# Patient Record
Sex: FEMALE | URBAN - METROPOLITAN AREA
[De-identification: names, ages, dates, MRNs, and addresses within clinical notes are randomized per-mention and may not be internally consistent; named-entity substitution may affect disease eponyms.]

---

## 2022-12-15 ENCOUNTER — OFFICE VISIT (OUTPATIENT)
Dept: URBAN - METROPOLITAN AREA CLINIC 2 | Facility: CLINIC | Age: 32
End: 2022-12-15

## 2024-05-29 ENCOUNTER — DASHBOARD ENCOUNTERS (OUTPATIENT)
Age: 34
End: 2024-05-29

## 2024-05-30 ENCOUNTER — OFFICE VISIT (OUTPATIENT)
Dept: URBAN - METROPOLITAN AREA CLINIC 19 | Facility: CLINIC | Age: 34
End: 2024-05-30
Payer: COMMERCIAL

## 2024-05-30 ENCOUNTER — LAB OUTSIDE AN ENCOUNTER (OUTPATIENT)
Dept: URBAN - METROPOLITAN AREA CLINIC 19 | Facility: CLINIC | Age: 34
End: 2024-05-30

## 2024-05-30 VITALS
WEIGHT: 180 LBS | HEIGHT: 67 IN | BODY MASS INDEX: 28.25 KG/M2 | HEART RATE: 87 BPM | DIASTOLIC BLOOD PRESSURE: 80 MMHG | SYSTOLIC BLOOD PRESSURE: 130 MMHG | TEMPERATURE: 97.7 F

## 2024-05-30 DIAGNOSIS — Z79.899 HIGH RISK MEDICATION USE: ICD-10-CM

## 2024-05-30 DIAGNOSIS — K50.811 CROHN'S DISEASE OF BOTH SMALL AND LARGE INTESTINE WITH RECTAL BLEEDING: ICD-10-CM

## 2024-05-30 PROBLEM — 71833008: Status: ACTIVE | Noted: 2024-05-30

## 2024-05-30 PROCEDURE — 99205 OFFICE O/P NEW HI 60 MIN: CPT | Performed by: INTERNAL MEDICINE

## 2024-05-30 RX ORDER — VEDOLIZUMAB 300 MG/5ML
AS DIRECTED INJECTION, POWDER, LYOPHILIZED, FOR SOLUTION INTRAVENOUS
Qty: 1 | Refills: 11 | OUTPATIENT
Start: 2024-05-30 | End: 2025-05-25

## 2024-06-01 ENCOUNTER — LAB OUTSIDE AN ENCOUNTER (OUTPATIENT)
Dept: URBAN - METROPOLITAN AREA CLINIC 19 | Facility: CLINIC | Age: 34
End: 2024-06-01

## 2024-06-03 ENCOUNTER — TELEPHONE ENCOUNTER (OUTPATIENT)
Dept: URBAN - METROPOLITAN AREA CLINIC 19 | Facility: CLINIC | Age: 34
End: 2024-06-03

## 2024-06-05 ENCOUNTER — WEB ENCOUNTER (OUTPATIENT)
Dept: URBAN - METROPOLITAN AREA CLINIC 19 | Facility: CLINIC | Age: 34
End: 2024-06-05

## 2024-06-05 RX ORDER — PREDNISONE 5 MG/1
4 TABLETS ONCE A DAY FOR 7 DAYS, 3 TABLETS ONCE A DAY FOR 7 DAYS, 2 TABLETS ONCE A DAY FOR 7 DAYS, 1 TABLET ONCE A DAY FOR 7 DAYS TABLET ORAL
Qty: 70 TABLET

## 2024-06-06 ENCOUNTER — TELEPHONE ENCOUNTER (OUTPATIENT)
Dept: URBAN - METROPOLITAN AREA CLINIC 19 | Facility: CLINIC | Age: 34
End: 2024-06-06

## 2024-06-06 LAB
ADENOVIRUS F 40/41: NOT DETECTED
CAMPYLOBACTER: NOT DETECTED
CLOSTRIDIUM DIFFICILE: NOT DETECTED
ENTAMOEBA HISTOLYTICA: NOT DETECTED
ENTEROAGGREGATIVE E.COLI: NOT DETECTED
ENTEROTOXIGENIC E.COLI: NOT DETECTED
GIARDIA LAMBLIA: NOT DETECTED
NOROVIRUS GI/GII: NOT DETECTED
ROTAVIRUS A: NOT DETECTED
SHIGA-LIKE TOXIN PRODUCING E.COLI: DETECTED
SHIGA-LIKE TOXIN PRODUCING E.COLI: NOT DETECTED
VIBRIO CHOLERAE: NOT DETECTED
VIBRIO SPP.: NOT DETECTED
YERSINIA ENTEROCOLITICA: NOT DETECTED
YERSINIA ENTEROCOLITICA: NOT DETECTED

## 2024-06-06 RX ORDER — AZITHROMYCIN MONOHYDRATE 500 MG/1
1 TABLET TABLET, FILM COATED ORAL DAILY
Qty: 3 TABLET | Refills: 0 | OUTPATIENT
Start: 2024-06-06 | End: 2024-06-09

## 2024-06-12 ENCOUNTER — OFFICE VISIT (OUTPATIENT)
Dept: URBAN - METROPOLITAN AREA CLINIC 18 | Facility: CLINIC | Age: 34
End: 2024-06-12
Payer: COMMERCIAL

## 2024-06-12 DIAGNOSIS — Z23 ENCOUNTER FOR VACCINATION: ICD-10-CM

## 2024-06-12 PROCEDURE — 90471 IMMUNIZATION ADMIN: CPT | Performed by: INTERNAL MEDICINE

## 2024-06-12 PROCEDURE — 90746 HEPB VACCINE 3 DOSE ADULT IM: CPT | Performed by: INTERNAL MEDICINE

## 2024-06-12 RX ORDER — VEDOLIZUMAB 300 MG/5ML
AS DIRECTED INJECTION, POWDER, LYOPHILIZED, FOR SOLUTION INTRAVENOUS
Qty: 1 | Refills: 11 | Status: ACTIVE | COMMUNITY
Start: 2024-05-30 | End: 2025-05-25

## 2024-06-12 RX ORDER — PREDNISONE 5 MG/1
4 TABLETS ONCE A DAY FOR 7 DAYS, 3 TABLETS ONCE A DAY FOR 7 DAYS, 2 TABLETS ONCE A DAY FOR 7 DAYS, 1 TABLET ONCE A DAY FOR 7 DAYS TABLET ORAL
Qty: 70 TABLET | Status: ACTIVE | COMMUNITY

## 2024-06-17 ENCOUNTER — TELEPHONE ENCOUNTER (OUTPATIENT)
Dept: URBAN - METROPOLITAN AREA CLINIC 19 | Facility: CLINIC | Age: 34
End: 2024-06-17

## 2024-07-10 ENCOUNTER — OFFICE VISIT (OUTPATIENT)
Dept: URBAN - METROPOLITAN AREA CLINIC 18 | Facility: CLINIC | Age: 34
End: 2024-07-10
Payer: COMMERCIAL

## 2024-07-10 DIAGNOSIS — Z23 ENCOUNTER FOR IMMUNIZATION: ICD-10-CM

## 2024-07-10 PROCEDURE — 90746 HEPB VACCINE 3 DOSE ADULT IM: CPT | Performed by: INTERNAL MEDICINE

## 2024-07-10 PROCEDURE — 90471 IMMUNIZATION ADMIN: CPT | Performed by: INTERNAL MEDICINE

## 2024-07-10 RX ORDER — PREDNISONE 5 MG/1
4 TABLETS ONCE A DAY FOR 7 DAYS, 3 TABLETS ONCE A DAY FOR 7 DAYS, 2 TABLETS ONCE A DAY FOR 7 DAYS, 1 TABLET ONCE A DAY FOR 7 DAYS TABLET ORAL
Qty: 70 TABLET | Status: ACTIVE | COMMUNITY

## 2024-07-10 RX ORDER — VEDOLIZUMAB 300 MG/5ML
AS DIRECTED INJECTION, POWDER, LYOPHILIZED, FOR SOLUTION INTRAVENOUS
Qty: 1 | Refills: 11 | Status: ACTIVE | COMMUNITY
Start: 2024-05-30 | End: 2025-05-25

## 2024-11-27 ENCOUNTER — WEB ENCOUNTER (OUTPATIENT)
Dept: URBAN - METROPOLITAN AREA CLINIC 19 | Facility: CLINIC | Age: 34
End: 2024-11-27

## 2024-12-03 ENCOUNTER — TELEPHONE ENCOUNTER (OUTPATIENT)
Dept: URBAN - METROPOLITAN AREA CLINIC 19 | Facility: CLINIC | Age: 34
End: 2024-12-03

## 2024-12-03 ENCOUNTER — CLAIMS CREATED FROM THE CLAIM WINDOW (OUTPATIENT)
Dept: URBAN - METROPOLITAN AREA SURGERY CENTER 31 | Facility: SURGERY CENTER | Age: 34
End: 2024-12-03
Payer: COMMERCIAL

## 2024-12-03 DIAGNOSIS — K50.80 CROHN'S COLITIS: ICD-10-CM

## 2024-12-03 DIAGNOSIS — K51.90 ULCERATIVE COLITIS, UNSPECIFIED: ICD-10-CM

## 2024-12-03 DIAGNOSIS — K63.89 OTHER SPECIFIED DISEASES OF INTESTINE: ICD-10-CM

## 2024-12-03 PROCEDURE — 45380 COLONOSCOPY AND BIOPSY: CPT | Performed by: INTERNAL MEDICINE

## 2024-12-03 PROCEDURE — 00811 ANES LWR INTST NDSC NOS: CPT | Performed by: NURSE ANESTHETIST, CERTIFIED REGISTERED

## 2024-12-03 RX ORDER — VEDOLIZUMAB 300 MG/5ML
AS DIRECTED INJECTION, POWDER, LYOPHILIZED, FOR SOLUTION INTRAVENOUS
Qty: 1 | Refills: 11 | Status: ACTIVE | COMMUNITY
Start: 2024-05-30 | End: 2025-05-25

## 2024-12-03 RX ORDER — PREDNISONE 5 MG/1
4 TABLETS ONCE A DAY FOR 7 DAYS, 3 TABLETS ONCE A DAY FOR 7 DAYS, 2 TABLETS ONCE A DAY FOR 7 DAYS, 1 TABLET ONCE A DAY FOR 7 DAYS TABLET ORAL
Qty: 70 TABLET | Status: ACTIVE | COMMUNITY

## 2024-12-05 ENCOUNTER — TELEPHONE ENCOUNTER (OUTPATIENT)
Dept: URBAN - METROPOLITAN AREA CLINIC 19 | Facility: CLINIC | Age: 34
End: 2024-12-05

## 2024-12-05 RX ORDER — PREDNISONE 5 MG/1
8 TABLETS ONCE A DAY FOR 7 DAYS, 7 TABLETS ONCE A DAY FOR 7 DAYS, 6 TABLETS ONCE A DAY FOR 7 DAYS, 5 TABLETS ONCE A DAY FOR 7 DAYS, 4 TABLETS ONCE A DAY FOR 7 DAYS, 3 TABLETS ONCE A DAY FOR 7 DAYS, 2 TABLETS ONCE A DAY FOR 7 DAYS, 1 TABLET ONCE A DAY FOR 7 DAYS TABLET ORAL ONCE A DAY
Qty: 252 TABLET | Refills: 0 | OUTPATIENT
Start: 2024-12-06 | End: 2025-01-05

## 2024-12-20 ENCOUNTER — OFFICE VISIT (OUTPATIENT)
Dept: URBAN - METROPOLITAN AREA CLINIC 128 | Facility: CLINIC | Age: 34
End: 2024-12-20
Payer: COMMERCIAL

## 2024-12-20 VITALS
DIASTOLIC BLOOD PRESSURE: 80 MMHG | TEMPERATURE: 98.7 F | WEIGHT: 178.8 LBS | HEIGHT: 67 IN | SYSTOLIC BLOOD PRESSURE: 130 MMHG | BODY MASS INDEX: 28.06 KG/M2

## 2024-12-20 DIAGNOSIS — K50.811 CROHN'S DISEASE OF BOTH SMALL AND LARGE INTESTINE WITH RECTAL BLEEDING: ICD-10-CM

## 2024-12-20 DIAGNOSIS — Z79.899 HIGH RISK MEDICATION USE: ICD-10-CM

## 2024-12-20 DIAGNOSIS — R11.0 NAUSEA: ICD-10-CM

## 2024-12-20 PROCEDURE — 99215 OFFICE O/P EST HI 40 MIN: CPT | Performed by: INTERNAL MEDICINE

## 2024-12-20 RX ORDER — ONDANSETRON 4 MG/1
1 TABLET ON THE TONGUE AND ALLOW TO DISSOLVE TABLET, ORALLY DISINTEGRATING ORAL
Qty: 60 TABLET | Refills: 1 | OUTPATIENT
Start: 2024-12-20

## 2024-12-20 RX ORDER — PREDNISONE 5 MG/1
4 TABLETS ONCE A DAY FOR 7 DAYS, 3 TABLETS ONCE A DAY FOR 7 DAYS, 2 TABLETS ONCE A DAY FOR 7 DAYS, 1 TABLET ONCE A DAY FOR 7 DAYS TABLET ORAL
Qty: 70 TABLET | Status: ACTIVE | COMMUNITY

## 2024-12-20 RX ORDER — RISANKIZUMAB-RZAA 360 MG/2.4
1.2 ML WEARABLE INJECTOR SUBCUTANEOUS
Refills: 11 | OUTPATIENT
Start: 2024-12-20

## 2024-12-20 RX ORDER — RISANKIZUMAB-RZAA 60 MG/ML
AS DIRECTED INJECTION INTRAVENOUS
OUTPATIENT
Start: 2024-12-20

## 2024-12-20 RX ORDER — PREDNISONE 5 MG/1
8 TABLETS ONCE A DAY FOR 7 DAYS, 7 TABLETS ONCE A DAY FOR 7 DAYS, 6 TABLETS ONCE A DAY FOR 7 DAYS, 5 TABLETS ONCE A DAY FOR 7 DAYS, 4 TABLETS ONCE A DAY FOR 7 DAYS, 3 TABLETS ONCE A DAY FOR 7 DAYS, 2 TABLETS ONCE A DAY FOR 7 DAYS, 1 TABLET ONCE A DAY FOR 7 DAYS TABLET ORAL ONCE A DAY
Qty: 252 TABLET | Refills: 0 | Status: ACTIVE | COMMUNITY
Start: 2024-12-06 | End: 2025-01-05

## 2024-12-20 RX ORDER — VEDOLIZUMAB 300 MG/5ML
AS DIRECTED INJECTION, POWDER, LYOPHILIZED, FOR SOLUTION INTRAVENOUS
Qty: 1 | Refills: 11 | Status: ACTIVE | COMMUNITY
Start: 2024-05-30 | End: 2025-05-25

## 2024-12-20 NOTE — HPI-TODAY'S VISIT:
Mrs. Boggs is a 34 year old female with Crohn's disease who was last seen in GI clinic on 5/30/2024.    She is currently on entyvio and reported feeling good when she came in for her colonoscopy.   On 12/3/2024 colonoscopy showed normal terminal ileum and diffuse erythema and ulcerated mucosa in the entire colon.   Pathology showed diffuse active colitis with mild architectural disarray in the rectum, ascending colon, and cecum. The sigmoid colon was unremarkable. The desceding colon and trasnverse colon shoed focal active colitis.   She and her boyfriend both have had URI illness which she reports having difficulty getting better from. She reports testing negative for COVID and having no fevers 1 week.   Prior history is summarized below: -She was diagnosed with Crohn's disease at age 23.  -She reports having a colonoscopy 1/2023 with Dr. Brand and reports being told she had inflammation in the colon; she reports having inflammation in her terminal ileum.  -She was on mesalamine which caused her to have migraines. She was on humira but it stopped working.

## 2024-12-26 ENCOUNTER — CLAIMS CREATED FROM THE CLAIM WINDOW (OUTPATIENT)
Dept: URBAN - METROPOLITAN AREA MEDICAL CENTER 18 | Facility: MEDICAL CENTER | Age: 34
End: 2024-12-26

## 2024-12-26 PROCEDURE — 99254 IP/OBS CNSLTJ NEW/EST MOD 60: CPT | Performed by: STUDENT IN AN ORGANIZED HEALTH CARE EDUCATION/TRAINING PROGRAM

## 2024-12-27 ENCOUNTER — CLAIMS CREATED FROM THE CLAIM WINDOW (OUTPATIENT)
Dept: URBAN - METROPOLITAN AREA MEDICAL CENTER 18 | Facility: MEDICAL CENTER | Age: 34
End: 2024-12-27

## 2024-12-27 PROCEDURE — 99232 SBSQ HOSP IP/OBS MODERATE 35: CPT | Performed by: STUDENT IN AN ORGANIZED HEALTH CARE EDUCATION/TRAINING PROGRAM

## 2024-12-28 ENCOUNTER — CLAIMS CREATED FROM THE CLAIM WINDOW (OUTPATIENT)
Dept: URBAN - METROPOLITAN AREA MEDICAL CENTER 18 | Facility: MEDICAL CENTER | Age: 34
End: 2024-12-28

## 2024-12-28 PROCEDURE — 99232 SBSQ HOSP IP/OBS MODERATE 35: CPT | Performed by: STUDENT IN AN ORGANIZED HEALTH CARE EDUCATION/TRAINING PROGRAM

## 2025-01-05 ENCOUNTER — TELEPHONE ENCOUNTER (OUTPATIENT)
Dept: URBAN - METROPOLITAN AREA CLINIC 19 | Facility: CLINIC | Age: 35
End: 2025-01-05

## 2025-02-12 ENCOUNTER — OFFICE VISIT (OUTPATIENT)
Dept: URBAN - METROPOLITAN AREA CLINIC 18 | Facility: CLINIC | Age: 35
End: 2025-02-12
Payer: COMMERCIAL

## 2025-02-12 DIAGNOSIS — Z23 ENCOUNTER FOR IMMUNIZATION: ICD-10-CM

## 2025-02-12 PROCEDURE — 90746 HEPB VACCINE 3 DOSE ADULT IM: CPT | Performed by: INTERNAL MEDICINE

## 2025-02-12 PROCEDURE — 90471 IMMUNIZATION ADMIN: CPT | Performed by: INTERNAL MEDICINE

## 2025-02-12 RX ORDER — PREDNISONE 5 MG/1
4 TABLETS ONCE A DAY FOR 7 DAYS, 3 TABLETS ONCE A DAY FOR 7 DAYS, 2 TABLETS ONCE A DAY FOR 7 DAYS, 1 TABLET ONCE A DAY FOR 7 DAYS TABLET ORAL
Qty: 70 TABLET | Status: ACTIVE | COMMUNITY

## 2025-02-12 RX ORDER — ONDANSETRON 4 MG/1
1 TABLET ON THE TONGUE AND ALLOW TO DISSOLVE TABLET, ORALLY DISINTEGRATING ORAL
Qty: 60 TABLET | Refills: 1 | Status: ACTIVE | COMMUNITY
Start: 2024-12-20

## 2025-02-12 RX ORDER — VEDOLIZUMAB 300 MG/5ML
AS DIRECTED INJECTION, POWDER, LYOPHILIZED, FOR SOLUTION INTRAVENOUS
Qty: 1 | Refills: 11 | Status: ACTIVE | COMMUNITY
Start: 2024-05-30 | End: 2025-05-25

## 2025-02-12 RX ORDER — RISANKIZUMAB-RZAA 60 MG/ML
AS DIRECTED INJECTION INTRAVENOUS
Status: ACTIVE | COMMUNITY
Start: 2024-12-20

## 2025-02-12 RX ORDER — RISANKIZUMAB-RZAA 360 MG/2.4
1.2 ML WEARABLE INJECTOR SUBCUTANEOUS
Refills: 11 | Status: ACTIVE | COMMUNITY
Start: 2024-12-20

## 2025-02-14 ENCOUNTER — TELEPHONE ENCOUNTER (OUTPATIENT)
Dept: URBAN - METROPOLITAN AREA CLINIC 19 | Facility: CLINIC | Age: 35
End: 2025-02-14

## 2025-02-14 ENCOUNTER — OFFICE VISIT (OUTPATIENT)
Dept: URBAN - METROPOLITAN AREA CLINIC 19 | Facility: CLINIC | Age: 35
End: 2025-02-14
Payer: COMMERCIAL

## 2025-02-14 VITALS
DIASTOLIC BLOOD PRESSURE: 80 MMHG | HEIGHT: 67 IN | TEMPERATURE: 98.6 F | WEIGHT: 181.4 LBS | HEART RATE: 61 BPM | SYSTOLIC BLOOD PRESSURE: 120 MMHG | OXYGEN SATURATION: 97 % | BODY MASS INDEX: 28.47 KG/M2

## 2025-02-14 DIAGNOSIS — K50.811 CROHN'S DISEASE OF BOTH SMALL AND LARGE INTESTINE WITH RECTAL BLEEDING: ICD-10-CM

## 2025-02-14 DIAGNOSIS — Z79.899 HIGH RISK MEDICATION USE: ICD-10-CM

## 2025-02-14 PROCEDURE — 99215 OFFICE O/P EST HI 40 MIN: CPT | Performed by: INTERNAL MEDICINE

## 2025-02-14 RX ORDER — RISANKIZUMAB-RZAA 360 MG/2.4
1.2 ML WEARABLE INJECTOR SUBCUTANEOUS
Refills: 11 | Status: ACTIVE | COMMUNITY
Start: 2024-12-20

## 2025-02-14 RX ORDER — PREDNISONE 5 MG/1
4 TABLETS ONCE A DAY FOR 7 DAYS, 3 TABLETS ONCE A DAY FOR 7 DAYS, 2 TABLETS ONCE A DAY FOR 7 DAYS, 1 TABLET ONCE A DAY FOR 7 DAYS TABLET ORAL
Qty: 70 TABLET | Status: ACTIVE | COMMUNITY

## 2025-02-14 RX ORDER — VEDOLIZUMAB 300 MG/5ML
AS DIRECTED INJECTION, POWDER, LYOPHILIZED, FOR SOLUTION INTRAVENOUS
Qty: 1 | Refills: 11 | Status: ACTIVE | COMMUNITY
Start: 2024-05-30 | End: 2025-05-25

## 2025-02-14 RX ORDER — ONDANSETRON 4 MG/1
1 TABLET ON THE TONGUE AND ALLOW TO DISSOLVE TABLET, ORALLY DISINTEGRATING ORAL
Qty: 60 TABLET | Refills: 1 | Status: ACTIVE | COMMUNITY
Start: 2024-12-20

## 2025-02-14 RX ORDER — RISANKIZUMAB-RZAA 60 MG/ML
AS DIRECTED INJECTION INTRAVENOUS
Status: ACTIVE | COMMUNITY
Start: 2024-12-20

## 2025-02-14 NOTE — HPI-TODAY'S VISIT:
Mrs. Boggs is a 34 year old female with Crohn's disease who was last seen in GI clinic on 12/20/2024.      On 12/20/2024 we started her on skyrizi. She reports getting her first skyrizi infusion on 2/7/2025 at [facility].   She was admitted to [facility] 12/26/2024 for infectious colitis with norovirus. On 12/26/2024 CT A/P with IV contrast showed hepatomegaly and mild wall thickening of the cecum and ascending colon.   She reports she will be decreasing her prednisone to 5mg on 2/15/2025.   She reports she is doing well and feeling better since starting skyrizi.   Prior history is summarized below:  -She was diagnosed with Crohn's disease at age 23. She has been on lialda, humira, and entyvio in the past.  -She reports having a colonoscopy 1/2023 with Dr. Brand and reports being told she had inflammation in the colon; she reports having inflammation in her terminal ileum.  -She was on mesalamine which caused her to have migraines. She was on humira but it stopped working. -On 12/3/2024 colonoscopy showed normal terminal ileum and diffuse erythema and ulcerated mucosa in the entire colon. Pathology showed diffuse active colitis with mild architectural disarray in the rectum, ascending colon, and cecum. The sigmoid colon was unremarkable. The desceding colon and trasnverse colon shoed focal active colitis.

## 2025-04-11 ENCOUNTER — WEB ENCOUNTER (OUTPATIENT)
Dept: URBAN - METROPOLITAN AREA CLINIC 19 | Facility: CLINIC | Age: 35
End: 2025-04-11

## 2025-05-14 ENCOUNTER — LAB OUTSIDE AN ENCOUNTER (OUTPATIENT)
Dept: URBAN - METROPOLITAN AREA CLINIC 19 | Facility: CLINIC | Age: 35
End: 2025-05-14

## 2025-06-19 ENCOUNTER — P2P PATIENT RECORD (OUTPATIENT)
Age: 35
End: 2025-06-19

## 2025-08-13 ENCOUNTER — OFFICE VISIT (OUTPATIENT)
Dept: URBAN - METROPOLITAN AREA SURGERY CENTER 31 | Facility: SURGERY CENTER | Age: 35
End: 2025-08-13

## 2025-08-14 ENCOUNTER — LAB OUTSIDE AN ENCOUNTER (OUTPATIENT)
Dept: URBAN - METROPOLITAN AREA CLINIC 19 | Facility: CLINIC | Age: 35
End: 2025-08-14

## 2025-08-29 ENCOUNTER — OFFICE VISIT (OUTPATIENT)
Dept: URBAN - METROPOLITAN AREA CLINIC 19 | Facility: CLINIC | Age: 35
End: 2025-08-29

## 2025-08-29 RX ORDER — PREDNISONE 5 MG/1
4 TABLETS ONCE A DAY FOR 7 DAYS, 3 TABLETS ONCE A DAY FOR 7 DAYS, 2 TABLETS ONCE A DAY FOR 7 DAYS, 1 TABLET ONCE A DAY FOR 7 DAYS TABLET ORAL
Qty: 70 TABLET | Status: ACTIVE | COMMUNITY

## 2025-08-29 RX ORDER — RISANKIZUMAB-RZAA 360 MG/2.4
1.2 ML WEARABLE INJECTOR SUBCUTANEOUS
Refills: 11 | Status: ACTIVE | COMMUNITY
Start: 2024-12-20

## 2025-08-29 RX ORDER — RISANKIZUMAB-RZAA 60 MG/ML
AS DIRECTED INJECTION INTRAVENOUS
Status: ACTIVE | COMMUNITY
Start: 2024-12-20

## 2025-08-29 RX ORDER — ONDANSETRON 4 MG/1
1 TABLET ON THE TONGUE AND ALLOW TO DISSOLVE TABLET, ORALLY DISINTEGRATING ORAL
Qty: 60 TABLET | Refills: 1 | Status: ACTIVE | COMMUNITY
Start: 2024-12-20